# Patient Record
Sex: FEMALE | Race: WHITE | HISPANIC OR LATINO | Employment: FULL TIME | URBAN - METROPOLITAN AREA
[De-identification: names, ages, dates, MRNs, and addresses within clinical notes are randomized per-mention and may not be internally consistent; named-entity substitution may affect disease eponyms.]

---

## 2017-05-10 ENCOUNTER — HOSPITAL ENCOUNTER (EMERGENCY)
Facility: HOSPITAL | Age: 27
Discharge: HOME | End: 2017-05-10
Payer: COMMERCIAL

## 2017-05-10 ENCOUNTER — APPOINTMENT (EMERGENCY)
Dept: RADIOLOGY | Facility: HOSPITAL | Age: 27
End: 2017-05-10
Payer: COMMERCIAL

## 2017-05-10 VITALS
OXYGEN SATURATION: 97 % | DIASTOLIC BLOOD PRESSURE: 77 MMHG | SYSTOLIC BLOOD PRESSURE: 125 MMHG | RESPIRATION RATE: 18 BRPM | HEART RATE: 94 BPM | WEIGHT: 135 LBS | TEMPERATURE: 98.1 F

## 2017-05-10 DIAGNOSIS — J20.9 ACUTE BRONCHITIS: Primary | ICD-10-CM

## 2017-05-10 PROCEDURE — 99283 EMERGENCY DEPT VISIT LOW MDM: CPT

## 2017-05-10 PROCEDURE — 94640 AIRWAY INHALATION TREATMENT: CPT

## 2017-05-10 PROCEDURE — 71020 HB CHEST X-RAY 2VW FRONTAL&LATL: CPT

## 2017-05-10 RX ORDER — AZITHROMYCIN 250 MG/1
TABLET, FILM COATED ORAL
Qty: 6 TABLET | Refills: 0 | Status: SHIPPED | OUTPATIENT
Start: 2017-05-10 | End: 2017-12-20

## 2017-05-10 RX ORDER — PREDNISONE 10 MG/1
TABLET ORAL
Qty: 20 TABLET | Refills: 0 | Status: SHIPPED | OUTPATIENT
Start: 2017-05-10 | End: 2017-12-20

## 2017-05-10 RX ORDER — GUAIFENESIN 200 MG/1
400 TABLET ORAL EVERY 4 HOURS PRN
Qty: 30 TABLET | Refills: 0 | Status: SHIPPED | OUTPATIENT
Start: 2017-05-10 | End: 2017-12-20

## 2017-05-10 RX ORDER — ALBUTEROL SULFATE 2.5 MG/3ML
5 SOLUTION RESPIRATORY (INHALATION) ONCE
Status: COMPLETED | OUTPATIENT
Start: 2017-05-10 | End: 2017-05-10

## 2017-05-10 RX ORDER — ALBUTEROL SULFATE 90 UG/1
2 AEROSOL, METERED RESPIRATORY (INHALATION) EVERY 6 HOURS PRN
Qty: 8 G | Refills: 0 | Status: SHIPPED | OUTPATIENT
Start: 2017-05-10 | End: 2017-12-20

## 2017-05-10 RX ADMIN — IPRATROPIUM BROMIDE 0.5 MG: 0.5 SOLUTION RESPIRATORY (INHALATION) at 19:40

## 2017-05-10 RX ADMIN — ALBUTEROL SULFATE 5 MG: 2.5 SOLUTION RESPIRATORY (INHALATION) at 19:40

## 2017-05-10 RX ADMIN — ALBUTEROL SULFATE 5 MG: 2.5 SOLUTION RESPIRATORY (INHALATION) at 19:05

## 2017-05-10 RX ADMIN — IPRATROPIUM BROMIDE 0.5 MG: 0.5 SOLUTION RESPIRATORY (INHALATION) at 19:05

## 2017-12-20 ENCOUNTER — APPOINTMENT (EMERGENCY)
Dept: RADIOLOGY | Facility: HOSPITAL | Age: 27
End: 2017-12-20
Payer: COMMERCIAL

## 2017-12-20 ENCOUNTER — HOSPITAL ENCOUNTER (EMERGENCY)
Facility: HOSPITAL | Age: 27
Discharge: HOME | End: 2017-12-20
Attending: EMERGENCY MEDICINE | Admitting: EMERGENCY MEDICINE
Payer: COMMERCIAL

## 2017-12-20 VITALS
SYSTOLIC BLOOD PRESSURE: 113 MMHG | RESPIRATION RATE: 18 BRPM | WEIGHT: 135 LBS | DIASTOLIC BLOOD PRESSURE: 55 MMHG | OXYGEN SATURATION: 99 % | HEART RATE: 81 BPM | TEMPERATURE: 97.1 F

## 2017-12-20 DIAGNOSIS — M22.2X2 BILATERAL PATELLOFEMORAL SYNDROME: Primary | ICD-10-CM

## 2017-12-20 DIAGNOSIS — M22.2X1 BILATERAL PATELLOFEMORAL SYNDROME: Primary | ICD-10-CM

## 2017-12-20 PROCEDURE — 99283 EMERGENCY DEPT VISIT LOW MDM: CPT

## 2017-12-20 PROCEDURE — 73562 X-RAY EXAM OF KNEE 3: CPT

## 2017-12-20 PROCEDURE — 96372 THER/PROPH/DIAG INJ SC/IM: CPT

## 2017-12-20 RX ORDER — ACETAMINOPHEN 325 MG/1
650 TABLET ORAL ONCE
Status: COMPLETED | OUTPATIENT
Start: 2017-12-20 | End: 2017-12-20

## 2017-12-20 RX ORDER — KETOROLAC TROMETHAMINE 30 MG/ML
15 INJECTION, SOLUTION INTRAMUSCULAR; INTRAVENOUS ONCE
Status: COMPLETED | OUTPATIENT
Start: 2017-12-20 | End: 2017-12-20

## 2017-12-20 RX ORDER — NAPROXEN 500 MG/1
500 TABLET ORAL 2 TIMES DAILY WITH MEALS
Qty: 10 TABLET | Refills: 0 | Status: SHIPPED | OUTPATIENT
Start: 2017-12-20 | End: 2018-09-18

## 2017-12-20 RX ADMIN — ACETAMINOPHEN 650 MG: 325 TABLET, FILM COATED ORAL at 19:42

## 2017-12-20 RX ADMIN — KETOROLAC TROMETHAMINE 15 MG: 30 INJECTION, SOLUTION INTRAMUSCULAR at 19:42

## 2017-12-21 NOTE — ED ATTENDING ATTESTATION
Vivi Hanson MD, saw and evaluated the patient  I have discussed the patient with the resident/non-physician practitioner and agree with the resident's/non-physician practitioner's findings, Plan of Care, and MDM as documented in the resident's/non-physician practitioner's note, except where noted  All available labs and Radiology studies were reviewed  At this point I agree with the current assessment done in the Emergency Department  I have conducted an independent evaluation of this patient a history and physical is as follows:      Critical Care Time  CritCare Time    Procedures   31 yo female with right knee pain and swelling for one week, improved with motrin, then yesterday noted left knee swelling and pain  Pt stands for long periods of time for work  No tick exposure, no vaginal discharge or complaints  No fever  pmh asthma  Vss, afebrile, lungs cta, rrr, abdomen soft nontender, bilateral knee tenderness, limited rom due to pain mild swelling  Knee xrays, pain meds, msk pain, patellofemoral syndrome

## 2017-12-21 NOTE — DISCHARGE INSTRUCTIONS
Patellofemoral Pain Syndrome   WHAT YOU NEED TO KNOW:   Patellofemoral pain syndrome (PFPS) is pain in or around your patella (kneecap)  PFPS is also called runner's knee or jumper's knee and is common in athletes  Rest, ice, and elevate your knee  You may need tape or brace to support your kneecap  Wear shoe inserts as directed and go to physical therapy  DISCHARGE INSTRUCTIONS:   Contact your healthcare provider if:   · You have trouble walking  · You have a fever  · Your knee brace or sleeve is too tight  · Your symptoms are not getting better, or they get worse  · Your pain and swelling increase even after you take pain medicine  · You have questions or concerns about your condition or care  Manage your symptoms:   · Change your activity  You may need to rest your knee  You may need to change your exercise routine to low-impact activities  · Apply ice  on your knee for 15 to 20 minutes every hour or as directed  Use an ice pack, or put crushed ice in a plastic bag  Cover it with a towel  Ice helps prevent tissue damage and decreases swelling and pain  · Elevate  your knee above the level of your heart as often as you can  This will help decrease swelling and pain  Prop your knee on pillows or blankets to keep it elevated comfortably  · Support  Support your knee by wrapping it with tape or an elastic bandage  You may need a brace for more support  This will help decrease swelling and keep your kneecap in the correct spot  · Wear shoe inserts as directed  Orthotics or arch supports help keep your foot and ankle stable and in line to decrease stress on your knee  · Go to physical therapy as directed  A physical therapist will teach you exercises to help improve movement and strength, and to decrease pain  · Maintain a healthy weight  Ask your healthcare provider how much you should weigh  Ask him to help you create a weight loss plan if you are overweight   Losing weight can help decrease pressure on your knee  Medicines: You may need the following:  · Acetaminophen  decreases pain  It is available without a doctor's order  Ask how much to take and how often to take it  Follow directions  Acetaminophen can cause liver damage if not taken correctly  · NSAIDs , such as ibuprofen, help decrease swelling, pain, and fever  This medicine is available with or without a doctor's order  NSAIDs can cause stomach bleeding or kidney problems in certain people  If you take blood thinner medicine, always ask your healthcare provider if NSAIDs are safe for you  Always read the medicine label and follow directions  · Take your medicine as directed  Contact your healthcare provider if you think your medicine is not helping or if you have side effects  Tell him or her if you are allergic to any medicine  Keep a list of the medicines, vitamins, and herbs you take  Include the amounts, and when and why you take them  Bring the list or the pill bottles to follow-up visits  Carry your medicine list with you in case of an emergency  Prevent another episode:   · Wear the right shoes for your activities  Increase activity gradually  · Warm up before you exercise  Stretch your leg muscles before and after activity  Follow up with your healthcare provider as directed: You may be referred to an orthopedic surgeon  Write down your questions so you remember to ask them during your visits  © 2017 [address] [employer] Information is for End User's use only and may not be sold, redistributed or otherwise used for commercial purposes  All illustrations and images included in CareNotes® are the copyrighted property of [employer] , [employer]  or [employer]  The above information is an  only  It is not intended as medical advice for individual conditions or treatments   Talk to your doctor, nurse or pharmacist before following any medical regimen to see if it is safe and effective for you

## 2017-12-21 NOTE — ED PROVIDER NOTES
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  Patient describes the right knee pain with accompanying mild swelling and progressed to left knee swelling and pain  It is described as a dull achiness 5/10 at work with standing and becomes a 10/10 with terminal extension of the knee  Patient has never had anything like this before  Patient denies tick exposure or hiking  Patient is denies rash  Patient denies fever chills rigors trauma to the area headache lightheadedness dizziness neck pain neck stiffness chest pain palpitations shortness of breath cough pleurisy abdominal pain nausea vomiting diarrhea constipation urinary symptoms motor weakness numbness and tingling  None       Past Medical History:   Diagnosis Date    Asthma     Seasonal allergies        History reviewed  No pertinent surgical history  History reviewed  No pertinent family history  I have reviewed and agree with the history as documented  Social History   Substance Use Topics    Smoking status: Never Smoker    Smokeless tobacco: Never Used    Alcohol use Yes      Comment: socially        Review of Systems   Constitutional: Negative for activity change, appetite change, chills, diaphoresis, fatigue, fever and unexpected weight change  HENT: Negative for congestion, ear discharge, ear pain, facial swelling, hearing loss, nosebleeds, postnasal drip, rhinorrhea, sinus pressure, sneezing, sore throat and tinnitus  Eyes: Negative for photophobia, pain, redness, itching and visual disturbance     Respiratory: Negative for cough, chest tightness, shortness of breath, wheezing and stridor  Cardiovascular: Negative for chest pain, palpitations and leg swelling  Gastrointestinal: Negative for abdominal distention, abdominal pain, anal bleeding, blood in stool, constipation, diarrhea, nausea and vomiting  Endocrine: Negative for polydipsia, polyphagia and polyuria  Genitourinary: Negative for decreased urine volume, difficulty urinating, dysuria, enuresis, flank pain, frequency, hematuria, menstrual problem, urgency, vaginal bleeding, vaginal discharge and vaginal pain  Musculoskeletal: Positive for arthralgias and joint swelling  Negative for back pain, gait problem, myalgias, neck pain and neck stiffness  Skin: Negative for rash and wound  Allergic/Immunologic: Negative for environmental allergies, food allergies and immunocompromised state  Neurological: Negative for dizziness, tremors, seizures, syncope, facial asymmetry, speech difficulty, weakness, light-headedness, numbness and headaches  Hematological: Negative for adenopathy  Psychiatric/Behavioral: Negative for agitation, behavioral problems, confusion, dysphoric mood, hallucinations, self-injury, sleep disturbance and suicidal ideas  The patient is not nervous/anxious and is not hyperactive  Physical Exam  ED Triage Vitals [12/20/17 1901]   Temperature Pulse Respirations Blood Pressure SpO2   (!) 97 1 °F (36 2 °C) 81 18 113/55 99 %      Temp Source Heart Rate Source Patient Position - Orthostatic VS BP Location FiO2 (%)   Tympanic -- Sitting Left arm --      Pain Score       7           Orthostatic Vital Signs  Vitals:    12/20/17 1901   BP: 113/55   Pulse: 81   Patient Position - Orthostatic VS: Sitting       Physical Exam   Constitutional: She is oriented to person, place, and time  She appears well-developed and well-nourished  No distress  HENT:   Head: Normocephalic and atraumatic     Right Ear: External ear normal    Left Ear: External ear normal    Nose: Nose normal    Mouth/Throat: Oropharynx is clear and moist  No oropharyngeal exudate  Eyes: Conjunctivae and EOM are normal  Pupils are equal, round, and reactive to light  Right eye exhibits no discharge  Left eye exhibits no discharge  No scleral icterus  Neck: Normal range of motion  Neck supple  No JVD present  No tracheal deviation present  No thyromegaly present  Cardiovascular: Normal rate, regular rhythm, normal heart sounds and intact distal pulses  No murmur heard  Pulmonary/Chest: Effort normal and breath sounds normal  No stridor  No respiratory distress  She has no wheezes  Abdominal: Soft  Bowel sounds are normal  She exhibits no distension and no mass  There is no tenderness  There is no rebound and no guarding  No hernia  Musculoskeletal: Normal range of motion  She exhibits no edema, tenderness or deformity  Right knee: She exhibits swelling  She exhibits normal range of motion, no effusion, no ecchymosis, no deformity, no laceration, no erythema, normal alignment, no LCL laxity, normal patellar mobility, no bony tenderness, normal meniscus and no MCL laxity  No tenderness found  No medial joint line, no lateral joint line, no MCL, no LCL and no patellar tendon tenderness noted  Left knee: She exhibits swelling  She exhibits normal range of motion, no effusion, no ecchymosis, no deformity, no laceration, no erythema, normal alignment, no LCL laxity, normal patellar mobility, no bony tenderness, normal meniscus and no MCL laxity  No tenderness found  No medial joint line, no lateral joint line, no MCL, no LCL and no patellar tendon tenderness noted  Bilateral knee, negative Lachman's, negative Freddie's, positive patellar grind test, a negative anterior and posterior drawer test   Mild swelling  Lymphadenopathy:     She has no cervical adenopathy  Neurological: She is alert and oriented to person, place, and time  She displays normal reflexes  No cranial nerve deficit   She exhibits normal muscle tone  Skin: Skin is warm and dry  No rash noted  She is not diaphoretic  No erythema  Psychiatric: She has a normal mood and affect  Her behavior is normal  Judgment and thought content normal    Nursing note and vitals reviewed  ED Medications  Medications   ketorolac (TORADOL) injection 15 mg (15 mg Intramuscular Given 12/20/17 1942)   acetaminophen (TYLENOL) tablet 650 mg (650 mg Oral Given 12/20/17 1942)       Diagnostic Studies  Results Reviewed     None                 XR knee 3 views left non injury   ED Interpretation by Mariel Solorzano DO (12/20 2108)   Lateral tracking of the patella no acute fracture      Final Result by Masood Redding MD (12/21 2842)      Normal examination  Workstation performed: [id number]         XR knee 3 views right non injury   ED Interpretation by Mariel Solorzano DO (12/20 2108)   Lateral tracking of the patella no acute fracture      Final Result by Masood Redding MD (12/21 0203)      Normal examination  Workstation performed: [id number]               Procedures  Procedures      Phone Consults  ED Phone Contact    ED Course  ED Course                                MDM  Number of Diagnoses or Management Options  Bilateral patellofemoral syndrome:   Diagnosis management comments: 80-year-old female presenting with bilateral knee pain any swelling for the last 1 week, atraumatic  On exam vital signs are normal, patient has mild knee swelling bilaterally with positive patellar grind test   X-rays show lateral tracking of bilateral patellars, impression patellofemoral syndrome doubt septic arthritis doubt osseous fracture secondary negative x-rays, patient is constitutionally well and does not do IV drugs  Doubt gouty arthropathy or pseudogout as patient does not have a history and effusion is mild  Patient given Tylenol Motrin, patient felt better  Patient follow-up with Sports Medicine  PT recommended      [facility] Time    Disposition  Final diagnoses:   Bilateral patellofemoral syndrome     Time reflects when diagnosis was documented in both MDM as applicable and the Disposition within this note     Time User Action Codes Description Comment    12/20/2017  9:09 PM Dotzler, Collette Lords Add [M22 2X1,  M22 2X2] Bilateral patellofemoral syndrome       ED Disposition     ED Disposition Condition Comment    Discharge  Heydi Olsen discharge to home/self care  Condition at discharge: Good    Return precautions were discussed with patient  Patient understands when to return to  Emergency department  Patient agrees to discharge plan and follow up care  Follow-up Information     Follow up With Specialties Details Why Contact Info    Cole Garnica MD Sports Medicine, Orthopedic Surgery Go in 1 week As needed [address]  [phone]          Discharge Medication List as of 12/20/2017  9:10 PM      START taking these medications    Details   naproxen (NAPROSYN) 500 mg tablet Take 1 tablet by mouth 2 (two) times a day with meals for 5 days, Starting Wed 12/20/2017, Until Mon 12/25/2017, Print           No discharge procedures on file  ED Provider  Attending physically available and evaluated Heydi Olsen I managed the patient along with the ED Attending      Electronically Signed by         Reather Aase, DO  Resident  12/21/17 1013

## 2018-09-18 ENCOUNTER — HOSPITAL ENCOUNTER (EMERGENCY)
Facility: HOSPITAL | Age: 28
Discharge: HOME | End: 2018-09-18
Attending: EMERGENCY MEDICINE
Payer: COMMERCIAL

## 2018-09-18 VITALS
RESPIRATION RATE: 16 BRPM | DIASTOLIC BLOOD PRESSURE: 88 MMHG | TEMPERATURE: 98.5 F | BODY MASS INDEX: 23.9 KG/M2 | SYSTOLIC BLOOD PRESSURE: 125 MMHG | OXYGEN SATURATION: 98 % | WEIGHT: 134.92 LBS | HEART RATE: 71 BPM

## 2018-09-18 DIAGNOSIS — B00.2 ORAL HERPES SIMPLEX INFECTION: Primary | ICD-10-CM

## 2018-09-18 PROCEDURE — 99282 EMERGENCY DEPT VISIT SF MDM: CPT

## 2018-09-18 RX ORDER — ACYCLOVIR 400 MG/1
400 TABLET ORAL 3 TIMES DAILY
Qty: 21 TABLET | Refills: 0 | Status: SHIPPED | OUTPATIENT
Start: 2018-09-18 | End: 2018-09-25

## 2018-09-18 RX ORDER — GABAPENTIN 300 MG/1
100 CAPSULE ORAL 3 TIMES DAILY
COMMUNITY

## 2018-09-18 NOTE — DISCHARGE INSTRUCTIONS
Oral Herpes Simplex Virus Infections   WHAT YOU NEED TO KNOW:   Oral herpes simplex virus (HSV) infections cause sores to form on the mouth, lips, or gums  HSV has 2 types  Oral HSV infections are most often caused by HSV type 1  HSV type 2 normally affects the genital area, but may also occur in the mouth  After you are infected, the virus hides in your nerves and may return  An HSV infection that comes back is also known as a cold sore  DISCHARGE INSTRUCTIONS:   Medicines:   · Antiviral medicine: This decreases symptoms and shortens the amount of time blisters are present  You may also need to take it daily to prevent blisters  The medicine may be given as a liquid, pill, or ointment  Use as directed  · Numbing medicine: This decreases mouth pain  It is usually given as a mouth rinse  Use it before you eat or drink, or as directed  Follow up with your healthcare provider as directed:  Write down your questions so you remember to ask them during your visits  Self-care:   · Eat soft, bland foods:  Avoid salty, acidic, spicy, sharp-edged, and hard foods  Eat healthy foods to help healing  · Drink liquids:  Cool liquids may help soothe your mouth and numb the pain  Avoid citrus or carbonated drinks, such as orange or grapefruit juice, lemonade, or soda  These liquids may cause your mouth to hurt more  A straw may help if you have blisters on the lips or tongue  · Use ice:  Ice helps decrease swelling and pain  Drink cold water or suck on ice to help decrease pain on your tongue or inside your mouth  Use an ice pack, or put crushed ice in a plastic bag on your lip  Cover it with a towel and place it on your lip for 15 to 20 minutes every hour or as directed  Prevent the spread of the herpes simplex virus:   · Do not have close contact with people until the blisters heal  This includes touching, kissing, and oral sex       · Do not get close to babies or to people who are sick while you have cold sores     · Do not share eating utensils, towels, lip balm, or makeup with another person  · Do not touch the blisters or pick at the scabs  Do not touch other body parts, especially your eyes or genitals without washing your hands first  Wash your hands often  Contact your healthcare provider if:   · You have a fever  · Your symptoms become worse or do not improve a week after you start treatment  · You have difficulty eating or drinking because of the pain in your mouth  · You get a headache, are nauseated, or vomit  · Your eyes feel irritated, or you feel like you have something in your eye  · Your skin becomes itchy, swollen, or develops a rash after you take your medicine  · You have questions or concerns about your condition or care  Return to the emergency department if:   · You get a fever, feel achy, or see pus instead of clear fluid in the sores  · You get sores on your eyes  · You have abdominal pain, a severe headache, or confusion  · You get new symptoms, or old symptoms return after you have been treated  © 2017 [address] [facility] Information is for End User's use only and may not be sold, redistributed or otherwise used for commercial purposes  All illustrations and images included in CareNotes® are the copyrighted property of [employer]  or [employer]  The above information is an  only  It is not intended as medical advice for individual conditions or treatments  Talk to your doctor, nurse or pharmacist before following any medical regimen to see if it is safe and effective for you

## 2018-09-18 NOTE — ED PROVIDER NOTES
History  Chief Complaint   Patient presents with    Rash     patient with itchy rash on face, blisters on lips  49-year-old female who presents for evaluation facial rash x2 days  Symptoms started yesterday afternoon  She describes blistering across her lips with some mild localized lip swelling  She has some mild burning pain  She tried to apply calamine lotion and hydrocortisone cream without any relief of symptoms  She denies any fevers or chills facial swelling or difficulty swallowing  She denies a history of similar rash or any contact with similar rash  She denies any changes in soaps lotions detergents or medications  She offers no other complaints at this time  Prior to Admission Medications   Prescriptions Last Dose Informant Patient Reported? Taking?   gabapentin (NEURONTIN) 300 mg capsule   Yes Yes   Sig: Take 100 mg by mouth 3 (three) times a day      Facility-Administered Medications: None       Past Medical History:   Diagnosis Date    Asthma     Myalgia     Seasonal allergies        No past surgical history on file  No family history on file  I have reviewed and agree with the history as documented  Social History   Substance Use Topics    Smoking status: Never Smoker    Smokeless tobacco: Never Used    Alcohol use Yes      Comment: socially        Review of Systems   Constitutional: Negative for chills and fever  HENT: Positive for mouth sores  Negative for facial swelling and trouble swallowing  Respiratory: Negative for shortness of breath  Cardiovascular: Negative for chest pain  Skin: Positive for rash  Physical Exam  Physical Exam   Constitutional: She is oriented to person, place, and time  Vital signs are normal  She appears well-developed and well-nourished  Non-toxic appearance  She does not have a sickly appearance  She does not appear ill  No distress  Well appearing female   HENT:   Head: Normocephalic and atraumatic  Right Ear: Hearing and external ear normal    Left Ear: Hearing and external ear normal    Nose: Nose normal    Mouth/Throat: Uvula is midline, oropharynx is clear and moist and mucous membranes are normal  No oral lesions  Eyes: Conjunctivae and EOM are normal  Pupils are equal, round, and reactive to light  Neck: Normal range of motion  Neck supple  Cardiovascular: Normal rate, regular rhythm and normal heart sounds  Exam reveals no gallop and no friction rub  No murmur heard  Pulmonary/Chest: Effort normal and breath sounds normal  No respiratory distress  She has no decreased breath sounds  She has no wheezes  She has no rhonchi  She has no rales  Musculoskeletal: Normal range of motion  Neurological: She is alert and oriented to person, place, and time  Skin: Skin is warm and dry  Capillary refill takes less than 2 seconds  She is not diaphoretic  Psychiatric: She has a normal mood and affect  Nursing note and vitals reviewed  Vital Signs  ED Triage Vitals [09/18/18 0834]   Temperature Pulse Respirations Blood Pressure SpO2   98 5 °F (36 9 °C) 71 16 125/88 98 %      Temp Source Heart Rate Source Patient Position - Orthostatic VS BP Location FiO2 (%)   Oral -- Sitting Right arm --      Pain Score       7           Vitals:    09/18/18 0834   BP: 125/88   Pulse: 71   Patient Position - Orthostatic VS: Sitting       Visual Acuity      ED Medications  Medications - No data to display    Diagnostic Studies  Results Reviewed     None                 No orders to display              Procedures  Procedures       Phone Contacts  ED Phone Contact    ED Course                               MDM  Number of Diagnoses or Management Options  Oral herpes simplex infection: new and does not require workup  Diagnosis management comments:   33 yo F with facial rash x 1 day, started yesterday  VSS  Appears most c/w with oral herpes  Will treat with acyclovir  Advised f/u with PCP   RTED precautions given  Patient verbalizes understanding and agrees with plan  Patient Progress  Patient progress: stable    CritCare Time    Disposition  Final diagnoses:   Oral herpes simplex infection     Time reflects when diagnosis was documented in both MDM as applicable and the Disposition within this note     Time User Action Codes Description Comment    9/18/2018  8:51 AM Sally Healy Add [B00 2] Oral herpes simplex infection       ED Disposition     ED Disposition Condition Comment    Discharge  Jossie Brisk discharge to home/self care  Condition at discharge: Good        Follow-up Information     Follow up With Specialties Details Why Contact Info Additional Information    Court Yadav PA-C Family Medicine, Physician Assistant Schedule an appointment as soon as possible for a visit  [address] [employer] [facility]  [address] Emergency Department Emergency Medicine  If symptoms worsen- fevers, worsening rash [address]  [phone]  ED, [address] [place] [address], [id number]          Patient's Medications   Discharge Prescriptions    ACYCLOVIR (ZOVIRAX) 400 MG TABLET    Take 1 tablet (400 mg total) by mouth 3 (three) times a day for 7 days       Start Date: 9/18/2018 End Date: 9/25/2018       Order Dose: 400 mg       Quantity: 21 tablet    Refills: 0     No discharge procedures on file      ED Provider  Electronically Signed by           Miles Robledo PA-C  09/18/18 6303

## 2020-07-11 ENCOUNTER — OFFICE VISIT (OUTPATIENT)
Dept: URGENT CARE | Age: 30
End: 2020-07-11
Payer: COMMERCIAL

## 2020-07-11 DIAGNOSIS — Z20.822 COVID-19 RULED OUT: Primary | ICD-10-CM

## 2020-07-11 PROCEDURE — U0003 INFECTIOUS AGENT DETECTION BY NUCLEIC ACID (DNA OR RNA); SEVERE ACUTE RESPIRATORY SYNDROME CORONAVIRUS 2 (SARS-COV-2) (CORONAVIRUS DISEASE [COVID-19]), AMPLIFIED PROBE TECHNIQUE, MAKING USE OF HIGH THROUGHPUT TECHNOLOGIES AS DESCRIBED BY CMS-2020-01-R: HCPCS | Performed by: NURSE PRACTITIONER

## 2020-07-11 PROCEDURE — 99213 OFFICE O/P EST LOW 20 MIN: CPT | Performed by: NURSE PRACTITIONER

## 2020-07-11 NOTE — PROGRESS NOTES
[address] [facility]        NAME: Zaria Mendez is a 34 y o  female  : 1990    MRN: [id number]  DATE: 2020  TIME: 5:02 PM    Assessment and Plan   COVID-19 ruled out [[id number] [id number]]  1  COVID-19 ruled out  [facility] COVID-19 TEST- Office Collection         Patient Instructions     Self quarantine; wash hands frequently; cover coughs  Results may take 7-10 days  If new s/s report that ASAP  Follow up with PCP in 3-5 days  Proceed to  ER if symptoms worsen  Chief Complaint     Chief Complaint   Patient presents with    COVID-19     traveling         History of Present Illness       HPI   Reports traveling to [place] recently  Requesting to be tested for covid  Denies any symptoms    Review of Systems   Review of Systems   Constitutional: Negative for chills and fever  HENT: Negative for sore throat and trouble swallowing  Respiratory: Negative for cough and shortness of breath  Cardiovascular: Negative for chest pain  Gastrointestinal: Negative for nausea and vomiting  Current Medications       Current Outpatient Medications:     acyclovir (ZOVIRAX) 400 MG tablet, Take 1 tablet (400 mg total) by mouth 3 (three) times a day for 7 days, Disp: 21 tablet, Rfl: 0    gabapentin (NEURONTIN) 300 mg capsule, Take 100 mg by mouth 3 (three) times a day, Disp: , Rfl:     Current Allergies     Allergies as of 2020 - Reviewed 2020   Allergen Reaction Noted    Other  2018            The following portions of the patient's history were reviewed and updated as appropriate: allergies, current medications, past family history, past medical history, past social history, past surgical history and problem list      Past Medical History:   Diagnosis Date    Asthma     Myalgia     Seasonal allergies        History reviewed  No pertinent surgical history  History reviewed  No pertinent family history  Medications have been verified          Objective   There were no vitals taken for this visit  Physical Exam     Physical Exam   Constitutional: She appears well-developed  No distress  Neck: Normal range of motion  Cardiovascular: Normal rate and regular rhythm  Pulmonary/Chest: Effort normal and breath sounds normal  She has no wheezes

## 2020-07-11 NOTE — PATIENT INSTRUCTIONS
Novel Coronavirus   WHAT YOU NEED TO KNOW:   The nCoV is a new strain (type) of coronavirus that can cause severe respiratory (lung) infection  DISCHARGE INSTRUCTIONS:   Call your local emergency number (911 in the [address] [facility]) for any of the following:  Tell the  you may have nCoV  This will help anyone in the ambulance stay safe, and to call ahead to the hospital   · You have sudden shortness of breath  · You have a fast heartbeat or chest pain  Return to the emergency department if:   · You feel so dizzy that you have trouble standing up  · Your lips and fingernails turn blue  Call your doctor if:   · You have a fever over 102ºF (39ºC)  · Your sore throat gets worse or you see white or yellow spots in your throat  · Your symptoms get worse after 3 to 5 days or your cold is not better in 14 days  · You have a rash anywhere on your skin  · You have large, tender lumps in your neck  · You have thick, green, or yellow drainage from your nose  · You cough up thick yellow, green, or bloody mucus  · You are vomiting for more than 24 hours and cannot keep fluids down  · You have a bad earache  · You have questions or concerns about your condition or care  Medicines: You may need any of the following:  · Decongestants  help reduce nasal congestion and help you breathe more easily  If you take decongestant pills, they may make you feel restless or cause problems with your sleep  Do not use decongestant sprays for more than a few days  · Cough suppressants  help reduce coughing  Ask your healthcare provider which type of cough medicine is best for you  · NSAIDs , such as ibuprofen, help decrease swelling, pain, and fever  NSAIDs can cause stomach bleeding or kidney problems in certain people  If you take blood thinner medicine, always ask your healthcare provider if NSAIDs are safe for you  Always read the medicine label and follow directions      · Acetaminophen decreases pain and fever  It is available without a doctor's order  Ask how much to take and how often to take it  Follow directions  Read the labels of all other medicines you are using to see if they also contain acetaminophen, or ask your doctor or pharmacist  Acetaminophen can cause liver damage if not taken correctly  Do not use more than 4 grams (4,000 milligrams) total of acetaminophen in one day  · Take your medicine as directed  Contact your healthcare provider if you think your medicine is not helping or if you have side effects  Tell him or her if you are allergic to any medicine  Keep a list of the medicines, vitamins, and herbs you take  Include the amounts, and when and why you take them  Bring the list or the pill bottles to follow-up visits  Carry your medicine list with you in case of an emergency  Help relieve mild symptoms:   · Drink more liquids as directed  Liquids will help thin and loosen mucus so you can cough it up  Liquids will also help prevent dehydration  Liquids that help prevent dehydration include water, fruit juice, and broth  Do not drink liquids that contain caffeine  Caffeine can increase your risk for dehydration  Ask your healthcare provider how much liquid to drink each day  · Soothe a sore throat  Gargle with warm salt water  This helps your sore throat feel better  Make salt water by dissolving ¼ teaspoon salt in 1 cup warm water  You may also suck on hard candy or throat lozenges  You may use a sore throat spray  · Use a humidifier or vaporizer  Use a cool mist humidifier or a vaporizer to increase air moisture in your home  This may make it easier for you to breathe and help decrease your cough  · Use saline nasal drops as directed  These help relieve congestion  · Apply petroleum-based jelly around the outside of your nostrils  This can decrease irritation from blowing your nose  · Do not smoke    Nicotine and other chemicals in cigarettes and cigars can make your symptoms worse  They can also cause infections such as bronchitis or pneumonia  Ask your healthcare provider for information if you currently smoke and need help to quit  E-cigarettes or smokeless tobacco still contain nicotine  Talk to your healthcare provider before you use these products  Prevent the spread of nCoV:  If you are around anyone who has nCoV, the following can help you protect you from infection  Have the person follow the guidelines to prevent infecting others:  · Limit close contact with others  Anyone with nCoV should stay in a different room, or sleep in a separate bed  Others need to stay at least 6 feet (2 meters) away  The person should only go out of the house for medical appointments  He or she should always call the office of any healthcare provider  The provider will need to prepare to keep others safe  · Wear a medical mask when around others  You can wear a medical mask or have the person wear one  A medical mask will help prevent nCoV from spreading  · Cover your mouth and nose to sneeze or cough  Everyone should always cover a sneeze or cough  Use a tissue that covers the mouth and nose  Use the bend of our arm if a tissue is not available  Throw the tissue away in a trash can right away  Then wash your hands well with soap and water  Use hand  that contains alcohol if you cannot wash your hands  · Wash your hands often  You and everyone in your home must wash your hands throughout the day  Use soap and water  Use germ-killing gel if soap and water are not available  A person with nCoV should not touch his or her eyes or mouth without washing his or her hands first          · Do not share items  Do not share dishes, towels, or other items with anyone  Always wash items after a person who has nCoV uses them  · Clean surfaces often    Use household  or bleach diluted with water to clean counters, doorknobs, toilet seats, and other surfaces  · Do not handle live animals  The nCoV may be spread to animals, including pets  Until more is known, it is best for a person with nCoV not to touch, play with, or handle live animals  Follow up with your doctor as directed:  Write down your questions so you remember to ask them during your visits  © Copyright [address] Information is for End User's use only and may not be sold, redistributed or otherwise used for commercial purposes  All illustrations and images included in CareNotes® are the copyrighted property of [employer]  or [facility] [employer] Paz   The above information is an  only  It is not intended as medical advice for individual conditions or treatments  Talk to your doctor, nurse or pharmacist before following any medical regimen to see if it is safe and effective for you

## 2020-07-15 LAB — SARS-COV-2 RNA SPEC QL NAA+PROBE: NOT DETECTED

## 2020-07-17 ENCOUNTER — TELEPHONE (OUTPATIENT)
Dept: URGENT CARE | Facility: CLINIC | Age: 30
End: 2020-07-17